# Patient Record
Sex: MALE | Race: WHITE | NOT HISPANIC OR LATINO | URBAN - METROPOLITAN AREA
[De-identification: names, ages, dates, MRNs, and addresses within clinical notes are randomized per-mention and may not be internally consistent; named-entity substitution may affect disease eponyms.]

---

## 2017-03-17 ENCOUNTER — EMERGENCY (EMERGENCY)
Facility: HOSPITAL | Age: 47
LOS: 1 days | Discharge: HOME | End: 2017-03-17
Attending: INTERNAL MEDICINE | Admitting: INTERNAL MEDICINE
Payer: COMMERCIAL

## 2017-03-17 VITALS
WEIGHT: 154.98 LBS | HEIGHT: 69 IN | HEART RATE: 69 BPM | OXYGEN SATURATION: 99 % | TEMPERATURE: 97 F | DIASTOLIC BLOOD PRESSURE: 75 MMHG | RESPIRATION RATE: 15 BRPM | SYSTOLIC BLOOD PRESSURE: 115 MMHG

## 2017-03-17 VITALS
TEMPERATURE: 98 F | RESPIRATION RATE: 15 BRPM | HEART RATE: 66 BPM | SYSTOLIC BLOOD PRESSURE: 116 MMHG | DIASTOLIC BLOOD PRESSURE: 77 MMHG | OXYGEN SATURATION: 100 %

## 2017-03-17 DIAGNOSIS — R20.2 PARESTHESIA OF SKIN: ICD-10-CM

## 2017-03-17 DIAGNOSIS — R07.89 OTHER CHEST PAIN: ICD-10-CM

## 2017-03-17 DIAGNOSIS — E78.5 HYPERLIPIDEMIA, UNSPECIFIED: ICD-10-CM

## 2017-03-17 DIAGNOSIS — E03.9 HYPOTHYROIDISM, UNSPECIFIED: ICD-10-CM

## 2017-03-17 LAB
ALBUMIN SERPL ELPH-MCNC: 3.9 G/DL — SIGNIFICANT CHANGE UP (ref 3.3–5)
ALP SERPL-CCNC: 67 U/L — SIGNIFICANT CHANGE UP (ref 40–120)
ALT FLD-CCNC: 44 U/L — SIGNIFICANT CHANGE UP (ref 12–78)
ANION GAP SERPL CALC-SCNC: 6 MMOL/L — SIGNIFICANT CHANGE UP (ref 5–17)
AST SERPL-CCNC: 21 U/L — SIGNIFICANT CHANGE UP (ref 15–37)
BILIRUB SERPL-MCNC: 0.4 MG/DL — SIGNIFICANT CHANGE UP (ref 0.2–1.2)
BUN SERPL-MCNC: 22 MG/DL — SIGNIFICANT CHANGE UP (ref 7–23)
CALCIUM SERPL-MCNC: 8.8 MG/DL — SIGNIFICANT CHANGE UP (ref 8.5–10.1)
CHLORIDE SERPL-SCNC: 104 MMOL/L — SIGNIFICANT CHANGE UP (ref 96–108)
CK SERPL-CCNC: 116 U/L — SIGNIFICANT CHANGE UP (ref 26–308)
CK SERPL-CCNC: 132 U/L — SIGNIFICANT CHANGE UP (ref 26–308)
CO2 SERPL-SCNC: 31 MMOL/L — SIGNIFICANT CHANGE UP (ref 22–31)
CREAT SERPL-MCNC: 1.1 MG/DL — SIGNIFICANT CHANGE UP (ref 0.5–1.3)
GLUCOSE SERPL-MCNC: 92 MG/DL — SIGNIFICANT CHANGE UP (ref 70–99)
HCT VFR BLD CALC: 45.3 % — SIGNIFICANT CHANGE UP (ref 39–50)
HGB BLD-MCNC: 14.8 G/DL — SIGNIFICANT CHANGE UP (ref 13–17)
MCHC RBC-ENTMCNC: 28.6 PG — SIGNIFICANT CHANGE UP (ref 27–34)
MCHC RBC-ENTMCNC: 32.7 GM/DL — SIGNIFICANT CHANGE UP (ref 32–36)
MCV RBC AUTO: 87.4 FL — SIGNIFICANT CHANGE UP (ref 80–100)
PLATELET # BLD AUTO: 229 K/UL — SIGNIFICANT CHANGE UP (ref 150–400)
POTASSIUM SERPL-MCNC: 4.1 MMOL/L — SIGNIFICANT CHANGE UP (ref 3.5–5.3)
POTASSIUM SERPL-SCNC: 4.1 MMOL/L — SIGNIFICANT CHANGE UP (ref 3.5–5.3)
PROT SERPL-MCNC: 7.5 G/DL — SIGNIFICANT CHANGE UP (ref 6–8.3)
RBC # BLD: 5.18 M/UL — SIGNIFICANT CHANGE UP (ref 4.2–5.8)
RBC # FLD: 11.8 % — SIGNIFICANT CHANGE UP (ref 10.3–14.5)
SODIUM SERPL-SCNC: 141 MMOL/L — SIGNIFICANT CHANGE UP (ref 135–145)
TROPONIN I SERPL-MCNC: <.015 NG/ML — SIGNIFICANT CHANGE UP (ref 0.01–0.04)
TROPONIN I SERPL-MCNC: <.015 NG/ML — SIGNIFICANT CHANGE UP (ref 0.01–0.04)
WBC # BLD: 6.3 K/UL — SIGNIFICANT CHANGE UP (ref 3.8–10.5)
WBC # FLD AUTO: 6.3 K/UL — SIGNIFICANT CHANGE UP (ref 3.8–10.5)

## 2017-03-17 PROCEDURE — 99285 EMERGENCY DEPT VISIT HI MDM: CPT | Mod: 25

## 2017-03-17 PROCEDURE — 85027 COMPLETE CBC AUTOMATED: CPT

## 2017-03-17 PROCEDURE — 93005 ELECTROCARDIOGRAM TRACING: CPT

## 2017-03-17 PROCEDURE — 70450 CT HEAD/BRAIN W/O DYE: CPT | Mod: 26

## 2017-03-17 PROCEDURE — 70450 CT HEAD/BRAIN W/O DYE: CPT

## 2017-03-17 PROCEDURE — 36000 PLACE NEEDLE IN VEIN: CPT

## 2017-03-17 PROCEDURE — 71010: CPT | Mod: 26

## 2017-03-17 PROCEDURE — 71045 X-RAY EXAM CHEST 1 VIEW: CPT

## 2017-03-17 PROCEDURE — 84484 ASSAY OF TROPONIN QUANT: CPT

## 2017-03-17 PROCEDURE — 82550 ASSAY OF CK (CPK): CPT

## 2017-03-17 PROCEDURE — 80053 COMPREHEN METABOLIC PANEL: CPT

## 2017-03-17 PROCEDURE — 99284 EMERGENCY DEPT VISIT MOD MDM: CPT

## 2017-03-17 RX ORDER — ASPIRIN/CALCIUM CARB/MAGNESIUM 324 MG
325 TABLET ORAL ONCE
Qty: 0 | Refills: 0 | Status: COMPLETED | OUTPATIENT
Start: 2017-03-17 | End: 2017-03-17

## 2017-03-17 RX ADMIN — Medication 325 MILLIGRAM(S): at 01:19

## 2017-03-17 NOTE — ED PROVIDER NOTE - CARE PLAN
Principal Discharge DX:	Paresthesia Principal Discharge DX:	Atypical chest pain  Secondary Diagnosis:	Paresthesia

## 2017-03-17 NOTE — ED ADULT NURSE NOTE - CHPI ED SYMPTOMS NEG
no chills/no diaphoresis/no nausea/no vomiting/no back pain/no cough/no fever/no syncope/no dizziness/no shortness of breath

## 2017-03-17 NOTE — ED PROVIDER NOTE - CRANIAL NERVE AND PUPILLARY EXAM
tongue is midline/cranial nerves 2-12 intact/gag reflex intact/extra-ocular movements intact/central and peripheral vision intact/cough reflex intact/corneal reflex intact

## 2017-03-17 NOTE — ED PROVIDER NOTE - MEDICAL DECISION MAKING DETAILS
labs cxr ekg enzymes ct head aspirin labs cxr ekg enzymes ct head aspirin, seen by Dr Kathleen. Out patient cardio and neuro referrals given. Take ASA 325mg daily

## 2017-03-17 NOTE — ED ADULT NURSE NOTE - PMH
<<----- Click to add NO pertinent Past Medical History No pertinent past medical history GERD (gastroesophageal reflux disease)    Hyperlipidemia    Hypothyroidism

## 2017-03-17 NOTE — ED PROVIDER NOTE - OBJECTIVE STATEMENT
47 y/o w/m h/o GERD, HLD, Hypothyroid c/o left arm and hand numbness x 1 hour, no weakness, no chest pain, no headache, no neck pain

## 2017-03-17 NOTE — ED PROVIDER NOTE - PROGRESS NOTE DETAILS
The patient was cleared by Dr Kathleen after 2nd enzyme and he will f/u in the office for a stress test The patient was offered a consult with the neurologist, he refused and wants to to home, he will take the name of a referral

## 2017-03-17 NOTE — ED ADULT NURSE NOTE - OBJECTIVE STATEMENT
46 year old male presents to the ED with left arm numbness and tingling. Pt A+O x 4. Pt denies headache,  dizziness, chest pain, sob, or abdominal/epigastric pain. pt states the left arm pain started earlier in the evening and has not improved. JVD not noted at this time. S1/S2 heard. Lungs bilaterally clear. No trauma or injury to the left arm noted. Pt denies recent all or injury. No swelling or edema noted. Cap refill < 2 secs. Skin normal for race, warm, dry, and intact. Skin, lips, and nails free of pallor/ cyanosis.

## 2017-12-21 ENCOUNTER — TRANSCRIPTION ENCOUNTER (OUTPATIENT)
Age: 47
End: 2017-12-21

## 2019-07-12 ENCOUNTER — EMERGENCY (EMERGENCY)
Facility: HOSPITAL | Age: 49
LOS: 1 days | Discharge: HOME | End: 2019-07-12
Attending: INTERNAL MEDICINE | Admitting: INTERNAL MEDICINE
Payer: COMMERCIAL

## 2019-07-12 VITALS
HEART RATE: 72 BPM | WEIGHT: 154.98 LBS | HEIGHT: 69 IN | SYSTOLIC BLOOD PRESSURE: 104 MMHG | RESPIRATION RATE: 16 BRPM | DIASTOLIC BLOOD PRESSURE: 69 MMHG | TEMPERATURE: 99 F | OXYGEN SATURATION: 98 %

## 2019-07-12 PROCEDURE — 99283 EMERGENCY DEPT VISIT LOW MDM: CPT

## 2019-07-12 RX ORDER — ATORVASTATIN CALCIUM 80 MG/1
0.5 TABLET, FILM COATED ORAL
Qty: 0 | Refills: 0 | DISCHARGE

## 2019-07-12 RX ORDER — PANTOPRAZOLE SODIUM 20 MG/1
1 TABLET, DELAYED RELEASE ORAL
Qty: 0 | Refills: 0 | DISCHARGE

## 2019-07-12 RX ORDER — LEVOTHYROXINE SODIUM 125 MCG
1 TABLET ORAL
Qty: 0 | Refills: 0 | DISCHARGE

## 2019-07-12 NOTE — ED ADULT NURSE NOTE - NSFALLRSKOUTCOME_ED_ALL_ED
Please call Tayo Crespo and get collateral information about Denisha's cravings for cigarettes.  Have refilled the nicotine patch for 30 days with 1 refill but would recommend after that point if she is not smoking that we try to stop the nicotine patch as it is not meant to be a chronic medication.   Universal Safety Interventions

## 2019-07-12 NOTE — ED ADULT TRIAGE NOTE - CHIEF COMPLAINT QUOTE
Patient complaining of heart palpitations  started 2 days go he had a drink of alcohol and a soda 2 nights ago; started working 3-4 weeks night shift Patient complaining of heart palpitations  started 2 days ago, it started when he had a drink of alcohol and a soda 2 nights ago; started working 3-4 weeks night shift

## 2019-07-12 NOTE — ED ADULT NURSE NOTE - OBJECTIVE STATEMENT
Received to the ED a&ox4, with reports of palpitations that happened suddenly. Denies chest pain and SOB

## 2019-07-12 NOTE — ED ADULT NURSE NOTE - CHIEF COMPLAINT QUOTE
Patient complaining of heart palpitations  started 2 days go he had a drink of alcohol and a soda 2 nights ago; started working 3-4 weeks night shift

## 2019-07-13 PROBLEM — K21.9 GASTRO-ESOPHAGEAL REFLUX DISEASE WITHOUT ESOPHAGITIS: Chronic | Status: ACTIVE | Noted: 2017-03-17

## 2019-07-13 PROBLEM — E78.5 HYPERLIPIDEMIA, UNSPECIFIED: Chronic | Status: ACTIVE | Noted: 2017-03-17

## 2019-07-13 PROBLEM — E03.9 HYPOTHYROIDISM, UNSPECIFIED: Chronic | Status: ACTIVE | Noted: 2017-03-17

## 2019-07-13 LAB
ALBUMIN SERPL ELPH-MCNC: 3.8 G/DL — SIGNIFICANT CHANGE UP (ref 3.3–5)
ALP SERPL-CCNC: 73 U/L — SIGNIFICANT CHANGE UP (ref 40–120)
ALT FLD-CCNC: 47 U/L — SIGNIFICANT CHANGE UP (ref 12–78)
ANION GAP SERPL CALC-SCNC: 6 MMOL/L — SIGNIFICANT CHANGE UP (ref 5–17)
AST SERPL-CCNC: 28 U/L — SIGNIFICANT CHANGE UP (ref 15–37)
BILIRUB SERPL-MCNC: 0.4 MG/DL — SIGNIFICANT CHANGE UP (ref 0.2–1.2)
BUN SERPL-MCNC: 25 MG/DL — HIGH (ref 7–23)
CALCIUM SERPL-MCNC: 9.1 MG/DL — SIGNIFICANT CHANGE UP (ref 8.5–10.1)
CHLORIDE SERPL-SCNC: 106 MMOL/L — SIGNIFICANT CHANGE UP (ref 96–108)
CO2 SERPL-SCNC: 28 MMOL/L — SIGNIFICANT CHANGE UP (ref 22–31)
CREAT SERPL-MCNC: 1.1 MG/DL — SIGNIFICANT CHANGE UP (ref 0.5–1.3)
GLUCOSE SERPL-MCNC: 100 MG/DL — HIGH (ref 70–99)
HCT VFR BLD CALC: 41.9 % — SIGNIFICANT CHANGE UP (ref 39–50)
HGB BLD-MCNC: 14.4 G/DL — SIGNIFICANT CHANGE UP (ref 13–17)
MCHC RBC-ENTMCNC: 29.4 PG — SIGNIFICANT CHANGE UP (ref 27–34)
MCHC RBC-ENTMCNC: 34.4 GM/DL — SIGNIFICANT CHANGE UP (ref 32–36)
MCV RBC AUTO: 85.7 FL — SIGNIFICANT CHANGE UP (ref 80–100)
NRBC # BLD: 0 /100 WBCS — SIGNIFICANT CHANGE UP (ref 0–0)
PLATELET # BLD AUTO: 244 K/UL — SIGNIFICANT CHANGE UP (ref 150–400)
POTASSIUM SERPL-MCNC: 3.7 MMOL/L — SIGNIFICANT CHANGE UP (ref 3.5–5.3)
POTASSIUM SERPL-SCNC: 3.7 MMOL/L — SIGNIFICANT CHANGE UP (ref 3.5–5.3)
PROT SERPL-MCNC: 7.4 G/DL — SIGNIFICANT CHANGE UP (ref 6–8.3)
RBC # BLD: 4.89 M/UL — SIGNIFICANT CHANGE UP (ref 4.2–5.8)
RBC # FLD: 12.4 % — SIGNIFICANT CHANGE UP (ref 10.3–14.5)
SODIUM SERPL-SCNC: 140 MMOL/L — SIGNIFICANT CHANGE UP (ref 135–145)
WBC # BLD: 6.04 K/UL — SIGNIFICANT CHANGE UP (ref 3.8–10.5)
WBC # FLD AUTO: 6.04 K/UL — SIGNIFICANT CHANGE UP (ref 3.8–10.5)

## 2019-07-13 PROCEDURE — 93010 ELECTROCARDIOGRAM REPORT: CPT

## 2019-07-13 PROCEDURE — 99283 EMERGENCY DEPT VISIT LOW MDM: CPT | Mod: 25

## 2019-07-13 PROCEDURE — 85027 COMPLETE CBC AUTOMATED: CPT

## 2019-07-13 PROCEDURE — 93005 ELECTROCARDIOGRAM TRACING: CPT

## 2019-07-13 PROCEDURE — 73070 X-RAY EXAM OF ELBOW: CPT | Mod: 26,RT

## 2019-07-13 PROCEDURE — 36415 COLL VENOUS BLD VENIPUNCTURE: CPT

## 2019-07-13 PROCEDURE — 80053 COMPREHEN METABOLIC PANEL: CPT

## 2019-07-13 PROCEDURE — 73070 X-RAY EXAM OF ELBOW: CPT

## 2019-07-13 NOTE — ED PROVIDER NOTE - OBJECTIVE STATEMENT
49 y/o wm with c/o palpitations for a few days, no cp, no sob no syncope no neuro changes, he notes the symptoms after coffee but states that he doesn't drink much coffee.

## 2019-07-13 NOTE — ED PROVIDER NOTE - SIGNIFICANT NEGATIVE FINDINGS
no headache, no chest pain, no SOB, no abdominal pain,  no n/v/d, no urinary symptoms, no bleeding. no neuro changes.

## 2019-07-16 ENCOUNTER — TRANSCRIPTION ENCOUNTER (OUTPATIENT)
Age: 49
End: 2019-07-16

## 2019-07-28 ENCOUNTER — RESULT CHARGE (OUTPATIENT)
Age: 49
End: 2019-07-28

## 2019-07-29 ENCOUNTER — APPOINTMENT (OUTPATIENT)
Dept: PULMONOLOGY | Facility: CLINIC | Age: 49
End: 2019-07-29
Payer: COMMERCIAL

## 2019-07-29 VITALS
OXYGEN SATURATION: 97 % | HEIGHT: 69 IN | HEART RATE: 83 BPM | DIASTOLIC BLOOD PRESSURE: 71 MMHG | WEIGHT: 155 LBS | RESPIRATION RATE: 14 BRPM | SYSTOLIC BLOOD PRESSURE: 106 MMHG | BODY MASS INDEX: 22.96 KG/M2

## 2019-07-29 DIAGNOSIS — E78.00 PURE HYPERCHOLESTEROLEMIA, UNSPECIFIED: ICD-10-CM

## 2019-07-29 DIAGNOSIS — K21.9 GASTRO-ESOPHAGEAL REFLUX DISEASE W/OUT ESOPHAGITIS: ICD-10-CM

## 2019-07-29 DIAGNOSIS — Z82.5 FAMILY HISTORY OF ASTHMA AND OTHER CHRONIC LOWER RESPIRATORY DISEASES: ICD-10-CM

## 2019-07-29 DIAGNOSIS — R00.2 PALPITATIONS: ICD-10-CM

## 2019-07-29 DIAGNOSIS — R05 COUGH: ICD-10-CM

## 2019-07-29 DIAGNOSIS — E03.9 HYPOTHYROIDISM, UNSPECIFIED: ICD-10-CM

## 2019-07-29 PROCEDURE — 94727 GAS DIL/WSHOT DETER LNG VOL: CPT

## 2019-07-29 PROCEDURE — 95012 NITRIC OXIDE EXP GAS DETER: CPT

## 2019-07-29 PROCEDURE — 94729 DIFFUSING CAPACITY: CPT

## 2019-07-29 PROCEDURE — 99204 OFFICE O/P NEW MOD 45 MIN: CPT | Mod: 25

## 2019-07-29 PROCEDURE — 94060 EVALUATION OF WHEEZING: CPT

## 2019-07-29 RX ORDER — ROSUVASTATIN CALCIUM 10 MG/1
10 TABLET, FILM COATED ORAL
Qty: 30 | Refills: 0 | Status: ACTIVE | COMMUNITY
Start: 2018-05-21

## 2019-07-29 RX ORDER — LEVOTHYROXINE SODIUM 0.11 MG/1
112 TABLET ORAL
Qty: 30 | Refills: 0 | Status: ACTIVE | COMMUNITY
Start: 2018-06-08

## 2019-07-29 RX ORDER — FLUTICASONE PROPIONATE 50 UG/1
50 SPRAY, METERED NASAL DAILY
Qty: 1 | Refills: 3 | Status: ACTIVE | COMMUNITY
Start: 2019-07-29 | End: 1900-01-01

## 2019-07-29 NOTE — PROCEDURE
[FreeTextEntry1] : EXAM: CHEST PA LATERAL \par \par PROCEDURE DATE: 07/16/2019 \par \par INTERPRETATION: INDICATION: Heart palpitations \par \par COMPARISON: Chest radiograph dated 3/17/2017 \par \par FINDING: PA and lateral view of the chest demonstrates clear lungs. No \par effusion or pneumothorax is seen. Heart size is normal in transverse \par diameter. Osseous and soft tissue structures are unremarkable. \par \par IMPRESSION: \par No acute infiltrate \par \par PFT 7/29/19: Normal spirometry without a significant bronchodilator response.  Lung volumes normal. DLCO normal.\par \par Exhaled nitric oxide 7/29/19: 5, normal.

## 2019-07-29 NOTE — HISTORY OF PRESENT ILLNESS
[FreeTextEntry1] : 49M has been having palpitations x 3 weeks, went to ED/urgent care a few times with normal cxr, labs (although did not check cardiac enzymes or ddimer), EKG.  Then saw a cardiologist in [place]: EKG, stress test, echo, holter monitor--irregular heart beats?? planned to have a calcium scoring CT.  Was told there was not much to do.  Since the palpitations began has also noticed a cough which is dry, sometimes has clear mucous.  Feels a little bit of tightness in his chest not particularly short of breath.  No chest pains.  No prior history of lung disease.  No obvious trigger to the cough or palpitations.  Unsure if they are related.  Suffers from GERD, does not take medication for it. \par no weight change recently.\par no travel recently, no sick contacts\par denies leg swelling or chest pains.\par has a deviated septum, if sleeps on his back will occasionally wake up gasping for air.  Does not know  if he snores.  Feels tired in the am, having more daytime sleepiness than usual lately.  \par \par \par works for [employer]\par never smoker\par fam hx: mom emphysema (smoker)

## 2019-07-29 NOTE — PHYSICAL EXAM
[Well Groomed] : well groomed [General Appearance - In No Acute Distress] : no acute distress [Neck Appearance] : the appearance of the neck was normal [Heart Sounds] : normal S1 and S2 [Heart Rate And Rhythm] : heart rate and rhythm were normal [] : no respiratory distress [Respiration, Rhythm And Depth] : normal respiratory rhythm and effort [Exaggerated Use Of Accessory Muscles For Inspiration] : no accessory muscle use [Auscultation Breath Sounds / Voice Sounds] : lungs were clear to auscultation bilaterally [Nail Clubbing] : no clubbing of the fingernails [Cyanosis, Localized] : no localized cyanosis

## 2019-07-29 NOTE — ASSESSMENT
[FreeTextEntry1] : unclear if cough and palpitations are related.\par \par for cough, begin trial of flonase for PND, if this does not help would try PPI therapy for GERD.\par \par palpitations--will send ddimer to r/o PE although patient seems to be low risk for this\par \par reassess in 3 weeks.

## 2019-07-30 ENCOUNTER — OTHER (OUTPATIENT)
Age: 49
End: 2019-07-30

## 2019-07-30 LAB — DEPRECATED D DIMER PPP IA-ACNC: 743 NG/ML DDU

## 2019-08-23 ENCOUNTER — APPOINTMENT (OUTPATIENT)
Dept: PULMONOLOGY | Facility: CLINIC | Age: 49
End: 2019-08-23

## 2019-12-17 ENCOUNTER — RX RENEWAL (OUTPATIENT)
Age: 49
End: 2019-12-17

## 2021-02-07 ENCOUNTER — TRANSCRIPTION ENCOUNTER (OUTPATIENT)
Age: 51
End: 2021-02-07

## 2021-02-20 ENCOUNTER — TRANSCRIPTION ENCOUNTER (OUTPATIENT)
Age: 51
End: 2021-02-20

## 2021-04-07 ENCOUNTER — TRANSCRIPTION ENCOUNTER (OUTPATIENT)
Age: 51
End: 2021-04-07

## 2021-10-13 ENCOUNTER — TRANSCRIPTION ENCOUNTER (OUTPATIENT)
Age: 51
End: 2021-10-13

## 2021-10-18 ENCOUNTER — TRANSCRIPTION ENCOUNTER (OUTPATIENT)
Age: 51
End: 2021-10-18

## 2021-10-25 ENCOUNTER — TRANSCRIPTION ENCOUNTER (OUTPATIENT)
Age: 51
End: 2021-10-25

## 2022-02-14 RX ORDER — PANTOPRAZOLE 40 MG/1
40 TABLET, DELAYED RELEASE ORAL
Qty: 90 | Refills: 3 | Status: ACTIVE | COMMUNITY
Start: 2022-02-14 | End: 1900-01-01

## 2022-02-14 RX ORDER — OMEPRAZOLE 40 MG/1
40 CAPSULE, DELAYED RELEASE ORAL
Qty: 30 | Refills: 1 | Status: DISCONTINUED | COMMUNITY
Start: 2019-08-01 | End: 2022-02-14

## 2022-03-15 ENCOUNTER — TRANSCRIPTION ENCOUNTER (OUTPATIENT)
Age: 52
End: 2022-03-15

## 2022-03-23 ENCOUNTER — TRANSCRIPTION ENCOUNTER (OUTPATIENT)
Age: 52
End: 2022-03-23

## 2023-06-19 NOTE — ED PROVIDER NOTE - NIHSS RESULTS
LVM for patient regarding cancelled appointment due to provider is no longer seeing New Patients. Patient will need to reschedule with either Dr. Thayer or Dr. Liu or with another ENT Provider at another location. Additionally, we have a new Provider Dr. Durbin starting in September if patient would like to wait and schedule then. Provided ENT Clinic number for rescheduling needs. Also sent patient a cancelled appointment letter and a Imp[employer] Message.  
0 (no Stroke)

## 2024-11-19 NOTE — ED PROVIDER NOTE - NIH STROKE SCALE: 10. DYSARTHRIA, QM
"             [facility] Medicine Services  Discharge Summary    Date of Service: 2024  Patient Name: Kenny Cruz  : 1969  MRN: [id number]    Date of Admission: 11/15/2024  Discharge Diagnosis: Atrial fibrillation with rapid ventricular response  Date of Discharge: 2024  Primary Care Physician: Cherelle Bernabe APRN    Presenting Problem:   Atrial fibrillation with rapid ventricular response [I48.91]  Atrial fibrillation with RVR [I48.91]  Atrial flutter [I48.92]    Active and Resolved Hospital Problems:  Active Hospital Problems    Diagnosis POA    **Atrial fibrillation with RVR [I48.91] Yes    Atrial flutter [I48.92] Yes      Resolved Hospital Problems   No resolved problems to display.         Hospital Course     HPI:  Per the H&P written by Brendon Cho, dated 11/15/2024:  \"Kenny Cruz is a 55 y.o. male with past medical history of hypertension who presented to [facility] on 11/15/2024 from urgent care with complaints of rapid heartbeat.  Patient states she has been dealing with cough and congestion recently and went to urgent care and was diagnosed with bronchitis. He was taking TheraFlu he took some last night today he noticed his heart was running fast. He had with the lightheadedness and dizziness. He went to urgent care and he was referred here for heart rate that is running in the 150s. He denies any chest pain neck arm jaw pain or shortness of breath.  Noted to be in A-fib RVR, new onset.  Started on Cardizem drip. \"    Hospital Course:  Patient admitted as above.  Cardiology consulted.  Patient initially started on Cardizem drip.  Underwent DELANEY with cardioversion on 2024 without complication.  Remained in normal sinus rhythm.  Started on metoprolol and Eliquis for atrial fibrillation.  During admission, also treated for bronchitis and acute conjunctivitis.  Given antibiotics and eyedrops at time of discharge.  Plans for discharge today with outpatient follow-up " with PCP and cardiology as below.    DISCHARGE Follow Up Recommendations for labs and diagnostics:  - Follow-up with PCP within 2 weeks of discharge  - Recommend outpatient sleep study as patient exhibited multiple signs of obstructive sleep apnea during admission  - Follow-up with cardiology within 2 weeks of discharge    Reasons For Change In Medications and Indications for New Medications:  Stop losartan/hydrochlorothiazide  Continue prednisone taper  Continue amoxicillin and gentamicin eyedrops  Continue Eliquis and metoprolol for atrial fibrillation    Day of Discharge     Vital Signs:  Temp:  [98.1 °F (36.7 °C)-98.9 °F (37.2 °C)] 98.9 °F (37.2 °C)  Heart Rate:  [49-90] 85  Resp:  [13-26] 18  BP: (112-129)/(70-80) 129/72    Physical Exam:  General: No acute distress, appears stated age  Neuro: Awake and alert, oriented x3, no focal deficits appreciated  HEENT: EOMI, moist mucus membranes  CV: RRR, no murmurs appreciated, no peripheral edema  Pulm: CTAB, upper airway congestion that clears with cough, no increased work of breathing  Abd: Soft, nontender, nondistended  Skin: Warm, dry and intact  Psych: Appropriate mood and affect    Pertinent  and/or Most Recent Results     LAB RESULTS:      Lab 11/19/24  0435 11/18/24  0435 11/17/24  1003 11/16/24  0922 11/16/24  0554 11/15/24  1703   WBC 10.72 10.25 10.15  --  10.06 11.22*   HEMOGLOBIN 12.1* 12.3* 12.8*  --  13.6 14.2   HEMATOCRIT 35.6* 37.5 37.9  --  41.2 42.3   PLATELETS 236 221 202  --  202 215   NEUTROS ABS 7.82* 7.21* 7.14*  --   --  7.54*   IMMATURE GRANS (ABS) 0.15* 0.05 0.04  --   --  0.03   LYMPHS ABS 1.30 1.28 1.41  --   --  1.67   MONOS ABS 1.39* 1.68* 1.43*  --   --  1.67*   EOS ABS 0.02 0.01 0.09  --   --  0.24   MCV 89.9 89.5 89.4  --  89.6 89.4   D DIMER QUANT  --   --   --  0.58*  --   --          Lab 11/19/24  0435 11/18/24  0435 11/17/24  1003 11/16/24  0554 11/15/24  1703   SODIUM 135* 134* 135* 138 138   POTASSIUM 4.4 3.9 3.9 4.3 3.9    CHLORIDE 100 97* 99 102 101   CO2 25.6 26.8 26.9 26.0 25.7   ANION GAP 9.4 10.2 9.1 10.0 11.3   BUN 20 23* 14 12 11   CREATININE 0.98 1.31* 1.12 1.05 1.15   EGFR 89.9 63.5 76.6 82.8 74.2   GLUCOSE 122* 99 113* 118* 103*   CALCIUM 8.9 8.8 8.5* 9.0 9.3   MAGNESIUM  --   --   --  2.0 2.0   PHOSPHORUS  --   --   --  2.5  --    TSH  --   --   --   --  2.800         Lab 11/16/24  0554   TOTAL PROTEIN 7.0   ALBUMIN 4.0   GLOBULIN 3.0   ALT (SGPT) 12   AST (SGOT) 19   BILIRUBIN 1.7*   ALK PHOS 62         Lab 11/16/24  0554   PROBNP 434.0                 Brief Urine Lab Results       None          Microbiology Results (last 10 days)       Procedure Component Value - Date/Time    Respiratory Panel PCR w/COVID-19(SARS-CoV-2) LISA/JIMMY/NATE/PAD/COR/WALI In-House, NP Swab in UTM/VTM, 2 HR TAT - Swab, Nasopharynx [[id number]]  (Normal) Collected: 11/17/24 1544    Lab Status: Final result Specimen: Swab from Nasopharynx Updated: 11/17/24 6983     ADENOVIRUS, PCR Not Detected     Coronavirus 229E Not Detected     Coronavirus HKU1 Not Detected     Coronavirus NL63 Not Detected     Coronavirus OC43 Not Detected     COVID19 Not Detected     Human Metapneumovirus Not Detected     Human Rhinovirus/Enterovirus Not Detected     Influenza A PCR Not Detected     Influenza B PCR Not Detected     Parainfluenza Virus 1 Not Detected     Parainfluenza Virus 2 Not Detected     Parainfluenza Virus 3 Not Detected     Parainfluenza Virus 4 Not Detected     RSV, PCR Not Detected     Bordetella pertussis pcr Not Detected     Bordetella parapertussis PCR Not Detected     Chlamydophila pneumoniae PCR Not Detected     Mycoplasma pneumo by PCR Not Detected    Narrative:      In the setting of a positive respiratory panel with a viral infection PLUS a negative procalcitonin without other underlying concern for bacterial infection, consider observing off antibiotics or discontinuation of antibiotics and continue supportive care. If the respiratory panel is  positive for atypical bacterial infection (Bordetella pertussis, Chlamydophila pneumoniae, or Mycoplasma pneumoniae), consider antibiotic de-escalation to target atypical bacterial infection.          XR Chest 2 View    Result Date: 11/13/2024  Impression: Impression: No acute intrathoracic finding. Electronically Signed: Rohit Zaratesivakumar  11/13/2024 6:29 PM EST  Workstation ID: [id number]       Results for orders placed during the hospital encounter of 11/15/24    Adult Transesophageal Echo (DELANEY) W/ Cont if Necessary Per Protocol (Cardiology Department)    Interpretation Summary    Left ventricular systolic function is normal. Left ventricular ejection fraction appears to be 56 - 60%.    Left ventricular wall thickness is consistent with mild concentric hypertrophy.    Left ventricular diastolic function was normal.    Saline test results are negative.      Labs Pending at Discharge:   Pending Results       None            Procedures Performed   DELANEY with cardioversion       Consults:   Consults       Date and Time Order Name Status Description    11/15/2024  8:03 PM Inpatient Cardiology Consult      11/15/2024  6:23 PM Hospitalist (on-call MD unless specified)              Discharge Details        Discharge Medications        New Medications        Instructions Start Date   amoxicillin 875 MG tablet  Commonly known as: AMOXIL   875 mg, Oral, Every 12 Hours Scheduled      Eliquis 5 MG tablet tablet  Generic drug: apixaban   5 mg, Oral, Every 12 Hours Scheduled      gentamicin 0.3 % ophthalmic solution  Commonly known as: GARAMYCIN   2 drops, Right Eye, 4 Times Daily      guaiFENesin 600 MG 12 hr tablet  Commonly known as: MUCINEX   600 mg, Oral, Every 12 Hours Scheduled      metoprolol succinate XL 25 MG 24 hr tablet  Commonly known as: TOPROL-XL   25 mg, Oral, Every 24 Hours Scheduled      predniSONE 10 MG tablet  Commonly known as: DELTASONE  Replaces: predniSONE 10 MG (21) dose pack   Take 2 tablets by mouth Daily  for 1 day, THEN 1 tablet Daily for 2 days.   Start Date: November 20, 2024            Continue These Medications        Instructions Start Date   acetaminophen 500 MG tablet  Commonly known as: TYLENOL   500 mg, Every 6 Hours PRN             Stop These Medications      losartan-hydrochlorothiazide 50-12.5 MG per tablet  Commonly known as: HYZAAR     predniSONE 10 MG (21) dose pack  Commonly known as: DELTASONE  Replaced by: predniSONE 10 MG tablet            ASK your doctor about these medications        Instructions Start Date   albuterol sulfate  (90 Base) MCG/ACT inhaler  Commonly known as: PROVENTIL HFA;VENTOLIN HFA;PROAIR HFA   2 puffs, Inhalation, Every 4 Hours PRN      benzonatate 200 MG capsule  Commonly known as: TESSALON   200 mg, Oral, 3 Times Daily PRN               No Known Allergies    Discharge Disposition:   Home    Diet:  Heart healthy    Discharge Activity:   As tolerated    CODE STATUS:  Code Status and Medical Interventions: CPR (Attempt to Resuscitate); Full Support   Ordered at: 11/15/24 1925     Code Status (Patient has no pulse and is not breathing):    CPR (Attempt to Resuscitate)     Medical Interventions (Patient has pulse or is breathing):    Full Support       Future Appointments   Date Time Provider Department Center   12/12/2024  9:30 AM Angelito Garcia MD [facility]       Additional Instructions for the Follow-ups that You Need to Schedule       Discharge Follow-up with PCP   As directed       Currently Documented PCP:    Cherelle Bernabe APRN    PCP Phone Number:    [phone]     Follow Up Details: Follow up with PCP within 2 weeks of discharge        Discharge Follow-up with Specified Provider: Cardiology; 1 Month   As directed      To: Cardiology   Follow Up: 1 Month                Time spent on Discharge including face to face service:  >30 minutes    Signature: Electronically signed by Elis Singh MD, 11/19/24, 15:10 EST.  [facility]  [facility]     (0) Normal

## 2024-12-23 ENCOUNTER — NON-APPOINTMENT (OUTPATIENT)
Age: 54
End: 2024-12-23